# Patient Record
Sex: MALE | Race: OTHER
[De-identification: names, ages, dates, MRNs, and addresses within clinical notes are randomized per-mention and may not be internally consistent; named-entity substitution may affect disease eponyms.]

---

## 2020-02-23 ENCOUNTER — HOSPITAL ENCOUNTER (EMERGENCY)
Dept: HOSPITAL 88 - ER | Age: 35
Discharge: HOME | End: 2020-02-23
Payer: COMMERCIAL

## 2020-02-23 VITALS — DIASTOLIC BLOOD PRESSURE: 42 MMHG | SYSTOLIC BLOOD PRESSURE: 115 MMHG

## 2020-02-23 VITALS — HEIGHT: 76 IN | WEIGHT: 220 LBS | BODY MASS INDEX: 26.79 KG/M2

## 2020-02-23 DIAGNOSIS — S00.83XA: Primary | ICD-10-CM
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
## 2020-02-23 DIAGNOSIS — R07.89: ICD-10-CM

## 2020-02-23 PROCEDURE — 72125 CT NECK SPINE W/O DYE: CPT

## 2020-02-23 PROCEDURE — 99283 EMERGENCY DEPT VISIT LOW MDM: CPT

## 2020-02-23 PROCEDURE — 70450 CT HEAD/BRAIN W/O DYE: CPT

## 2020-02-23 PROCEDURE — 71101 X-RAY EXAM UNILAT RIBS/CHEST: CPT

## 2020-02-23 NOTE — DIAGNOSTIC IMAGING REPORT
Left rib multiple views



CPT code: 39828



History: Fall, left side chest pain



Comparison: None.



Findings: 



The rib structures on the left appear intact. There is no evidence of acute rib

fracture or dislocation identified.  Chest PA single view demonstrates no

consolidation, pleural effusion, or pneumothorax.



IMPRESSION:



No displaced rib fracture or dislocation.



Thank you for your referral. 



Signed by: Dr. Jaqui River MD on 2/23/2020 9:43 PM

## 2020-02-23 NOTE — DIAGNOSTIC IMAGING REPORT
EXAMINATION: Head CT without contrast.  





HISTORY:Status post fall.



COMPARISON:None.

TECHNIQUE: Multidetector axial images were obtained from the foramen magnum to

the vertex without contrast. The images were reconstructed using brain and bone

algorithms.  Thin section brain images were reformatted into coronal and

sagittal planes.

Dose modulation, iterative reconstruction, and/or weight based adjustment of

the mA/kV was utilized to reduce the radiation dose to as low as reasonably

achievable.



Intravenous contrast: None  



IMAGE QUALITY: Acceptable.



FINDINGS:

    Skull/scalp: No lytic or blastic. lesions.  No surgical changes.

    Parenchyma: No abnormal density.

No acute hemorrhage, mass or acute major vascular territorial infarct.

    Arteries: No density suggestive of thrombosis.   

    Dural sinuses: No abnormal density suggestive of thrombosis. 

    Ventricles: No hydrocephalus or displacement.

    Extra-axial spaces: No abnormal density.  

    Brain volume: Normal for age.

    Craniocervical junction: No mass, Chiari malformation, or basilar

invagination.

    Sella: No mass. 

    Paranasal/mastoid sinuses: Imaged portions unremarkable.





IMPRESSION:

No intracranial abnormality.



Signed by: Dr. Mag Lawson M.D. on 2/23/2020 8:59 PM

## 2020-02-23 NOTE — DIAGNOSTIC IMAGING REPORT
History: Status post fall. 



Comparison studies: None



Technique: 

Axial images were obtained through the cervical region..

Coronal and sagittal images reconstructed from the axial data.

Dose modulation, iterative reconstruction, and/or weight based adjustment of

the mA/kV was utilized to reduce the radiation dose to as low as reasonably

achievable.



Intravenous contrast: None



Findings:



Fractures: None.

Soft tissue injuries: None.



Atlantoaxial articulation: Intact.

Alignment: Loss of normal cervical lordosis is either positional or due to

muscle spasm. No scoliosis.

Cervicomedullary junction: No abnormalities. The foramen magnum is patent.

Soft tissues: No abnormalities. 



Vertebrae: 

No fractures, infection or neoplasm.



Degenerative changes: 

None.



IMPRESSION:



1.  No acute cervical spine fracture or dislocation. Loss of normal cervical

lordosis is either positional or due to muscle spasm.



2.  Ligament, spinal cord and or vascular abnormalities cannot be excluded on

the basis of this examination.







Signed by: Dr. Mag Lawson M.D. on 2/23/2020 9:02 PM